# Patient Record
Sex: FEMALE | URBAN - METROPOLITAN AREA
[De-identification: names, ages, dates, MRNs, and addresses within clinical notes are randomized per-mention and may not be internally consistent; named-entity substitution may affect disease eponyms.]

---

## 2021-03-24 ENCOUNTER — APPOINTMENT (RX ONLY)
Dept: URBAN - METROPOLITAN AREA CLINIC 134 | Facility: CLINIC | Age: 66
Setting detail: DERMATOLOGY
End: 2021-03-24

## 2021-03-24 VITALS — HEIGHT: 66 IN | WEIGHT: 125 LBS | TEMPERATURE: 97.16 F

## 2021-03-24 DIAGNOSIS — L65.0 TELOGEN EFFLUVIUM: ICD-10-CM

## 2021-03-24 PROCEDURE — ? ADDITIONAL NOTES

## 2021-03-24 PROCEDURE — ? TREATMENT REGIMEN

## 2021-03-24 PROCEDURE — ? COUNSELING

## 2021-03-24 PROCEDURE — 99214 OFFICE O/P EST MOD 30 MIN: CPT

## 2021-03-24 ASSESSMENT — LOCATION ZONE DERM: LOCATION ZONE: SCALP

## 2021-03-24 ASSESSMENT — LOCATION DETAILED DESCRIPTION DERM: LOCATION DETAILED: LEFT MEDIAL FRONTAL SCALP

## 2021-03-24 ASSESSMENT — LOCATION SIMPLE DESCRIPTION DERM: LOCATION SIMPLE: LEFT SCALP

## 2021-03-24 NOTE — PROCEDURE: ADDITIONAL NOTES
Detail Level: Simple
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  Etiology discussed.  Nutrafol discussed in place of her daily multi vitamin.  I would encourage she take the Nutrafol for 3-6 months to help get her through this phase of hair loss. I would like to see all her labs that have been done from Dr. Rosado, Dr. Braun, and Dr. Arango for evaluation.
Render Risk Assessment In Note?: no

## 2021-03-24 NOTE — HPI: HAIR LOSS
Previous Labs: Yes
How Did The Hair Loss Occur?: gradual in onset
How Severe Is Your Hair Loss?: mild
When Were The Labs Drawn? (Drawn...): Dr. Rosado’s office
Lab Details: WNL

## 2021-07-16 ENCOUNTER — APPOINTMENT (RX ONLY)
Dept: URBAN - METROPOLITAN AREA CLINIC 134 | Facility: CLINIC | Age: 66
Setting detail: DERMATOLOGY
End: 2021-07-16

## 2021-07-16 VITALS — WEIGHT: 130 LBS | HEIGHT: 67 IN

## 2021-07-16 DIAGNOSIS — Z71.89 OTHER SPECIFIED COUNSELING: ICD-10-CM

## 2021-07-16 DIAGNOSIS — L72.11 PILAR CYST: ICD-10-CM

## 2021-07-16 DIAGNOSIS — H10.45 OTHER CHRONIC ALLERGIC CONJUNCTIVITIS: ICD-10-CM

## 2021-07-16 DIAGNOSIS — D17 BENIGN LIPOMATOUS NEOPLASM: ICD-10-CM

## 2021-07-16 DIAGNOSIS — L70.8 OTHER ACNE: ICD-10-CM

## 2021-07-16 PROBLEM — D17.21 BENIGN LIPOMATOUS NEOPLASM OF SKIN AND SUBCUTANEOUS TISSUE OF RIGHT ARM: Status: ACTIVE | Noted: 2021-07-16

## 2021-07-16 PROCEDURE — 99214 OFFICE O/P EST MOD 30 MIN: CPT | Mod: 25

## 2021-07-16 PROCEDURE — ? TREATMENT REGIMEN

## 2021-07-16 PROCEDURE — ? OBSERVATION

## 2021-07-16 PROCEDURE — ? OTHER

## 2021-07-16 PROCEDURE — 10040 EXTRACTION: CPT

## 2021-07-16 PROCEDURE — ? ACNE SURGERY

## 2021-07-16 PROCEDURE — ? COUNSELING

## 2021-07-16 ASSESSMENT — LOCATION SIMPLE DESCRIPTION DERM
LOCATION SIMPLE: POSTERIOR SCALP
LOCATION SIMPLE: RIGHT FOREARM
LOCATION SIMPLE: RIGHT INFERIOR EYELID
LOCATION SIMPLE: UPPER BACK

## 2021-07-16 ASSESSMENT — LOCATION DETAILED DESCRIPTION DERM
LOCATION DETAILED: RIGHT INFERIOR OCCIPITAL SCALP
LOCATION DETAILED: SUPERIOR THORACIC SPINE
LOCATION DETAILED: RIGHT LATERAL INFERIOR EYELID
LOCATION DETAILED: RIGHT DISTAL DORSAL FOREARM

## 2021-07-16 ASSESSMENT — LOCATION ZONE DERM
LOCATION ZONE: TRUNK
LOCATION ZONE: SCALP
LOCATION ZONE: EYELID
LOCATION ZONE: ARM

## 2021-07-16 NOTE — PROCEDURE: TREATMENT REGIMEN
Samples Given: Retin-A micro 0.06% - apply small amount to cyst daily
Detail Level: Zone
Otc Regimen: Retin-a micro 0.06% - apply thin film daily
Otc Regimen: Nasocort - 2 squirts
Plan: AM:\\nCleanse with water\\nThermal water\\nVanicream with HA\\nSPF (neutrogena) \\n\\nPM:\\nVanicream bar or liquid soap\\nThermal water\\nVanicream with HA
Continue Regimen: Zyrtec

## 2021-07-16 NOTE — HPI: SKIN LESION
What Type Of Note Output Would You Prefer (Optional)?: Standard Output
How Severe Is Your Skin Lesion?: mild
Has Your Skin Lesion Been Treated?: not been treated
Is This A New Presentation, Or A Follow-Up?: Skin Lesions
Is This A New Presentation, Or A Follow-Up?: Skin Lesion
Additional History: \\n\\nThe patient thought this was a pimple but it is not healing.

## 2021-07-16 NOTE — PROCEDURE: ACNE SURGERY
Prep Text (Optional): Prior to removal the treatment areas were prepped in the usual fashion.
Render Number Of Lesions Treated: no
Post-Care Instructions: I reviewed with the patient in detail post-care instructions. Patient is to keep the treatment areas dry overnight, and then apply bacitracin twice daily until healed. Patient may apply hydrogen peroxide soaks to remove any crusting.
Detail Level: Detailed
Acne Type: Comedonal Lesions
Consent was obtained and risks were reviewed including but not limited to scarring, infection, bleeding, scabbing, incomplete removal, and allergy to anesthesia.
Extraction Method: extractor

## 2021-07-16 NOTE — HPI: OTHER
Condition:: Swelling
Please Describe Your Condition:: \\nThe patient has noticed swelling under her right eye for the last few months.

## 2021-07-16 NOTE — PROCEDURE: OTHER
Detail Level: Zone
Render Risk Assessment In Note?: yes
Other (Free Text): Will refer to general surgery if the patient would like this lesion removed
Note Text (......Xxx Chief Complaint.): This diagnosis correlates with the

## 2021-08-13 ENCOUNTER — APPOINTMENT (RX ONLY)
Dept: URBAN - METROPOLITAN AREA CLINIC 134 | Facility: CLINIC | Age: 66
Setting detail: DERMATOLOGY
End: 2021-08-13

## 2021-08-13 VITALS — HEIGHT: 67 IN | WEIGHT: 130 LBS

## 2021-08-13 DIAGNOSIS — Z71.89 OTHER SPECIFIED COUNSELING: ICD-10-CM

## 2021-08-13 DIAGNOSIS — L81.0 POSTINFLAMMATORY HYPERPIGMENTATION: ICD-10-CM

## 2021-08-13 PROBLEM — D23.4 OTHER BENIGN NEOPLASM OF SKIN OF SCALP AND NECK: Status: ACTIVE | Noted: 2021-08-13

## 2021-08-13 PROCEDURE — ? COUNSELING

## 2021-08-13 PROCEDURE — 99212 OFFICE O/P EST SF 10 MIN: CPT | Mod: 25

## 2021-08-13 PROCEDURE — ? OBSERVATION

## 2021-08-13 PROCEDURE — ? TREATMENT REGIMEN

## 2021-08-13 PROCEDURE — ? INCISION AND DRAINAGE

## 2021-08-13 PROCEDURE — 10060 I&D ABSCESS SIMPLE/SINGLE: CPT

## 2021-08-13 ASSESSMENT — LOCATION ZONE DERM: LOCATION ZONE: TRUNK

## 2021-08-13 ASSESSMENT — LOCATION SIMPLE DESCRIPTION DERM: LOCATION SIMPLE: UPPER BACK

## 2021-08-13 ASSESSMENT — LOCATION DETAILED DESCRIPTION DERM: LOCATION DETAILED: INFERIOR THORACIC SPINE

## 2021-08-13 NOTE — PROCEDURE: INCISION AND DRAINAGE
Consent was obtained and risks were reviewed including but not limited to delayed wound healing, infection, need for multiple I and D's, and pain.
Epidermal Closure: simple interrupted
Drainage Amount?: minimal
Detail Level: Detailed
Method: sterile needle
Body Location Override (Optional - Billing Will Still Be Based On Selected Body Map Location If Applicable): right inferior occipital scalp
Dressing: no dressing
Lesion Type: Cyst
Epidermal Sutures: 4-0 Ethilon
Curette: No
Wound Care: Petrolatum
Size Of Lesion In Cm (Optional But May Be Required For Some Insurances): 0
Anesthesia Type: 1% lidocaine with epinephrine
Curette Text (Optional): After the contents were expressed a curette was used to partially remove the cyst wall.
Anesthesia Volume In Cc: 0.6
Suture Text: The incision was partially closed with
Preparation Text: The area was prepped in the usual clean fashion.
Post-Care Instructions: I reviewed with the patient in detail post-care instructions. Patient should keep wound covered and call the office should any redness, pain, swelling or worsening occur.
Drainage Type?: cyst-like

## 2021-08-13 NOTE — PROCEDURE: TREATMENT REGIMEN
Detail Level: Zone
Continue Regimen: Finish remaining Retin-A micro 0.06%, then start sample tubes of Retin-a micro 0.08% cream- apply small amount to cysts daily
Plan: AM:\\nCleanse with water\\nThermal water\\nVanicream with HA\\nSPF (neutrogena) \\n\\nPM:\\nVanicream bar or liquid soap\\nThermal water\\nVanicream with HA
Plan: Re evaluate in 1 month.
Initiate Treatment: Discussed Retin-a, nightly should help.
Samples Given: Retin-A micro 0.08% cream.

## 2021-09-09 ENCOUNTER — APPOINTMENT (RX ONLY)
Dept: URBAN - METROPOLITAN AREA CLINIC 134 | Facility: CLINIC | Age: 66
Setting detail: DERMATOLOGY
End: 2021-09-09

## 2021-09-09 VITALS — WEIGHT: 128 LBS | HEIGHT: 67 IN

## 2021-09-09 DIAGNOSIS — L72.11 PILAR CYST: ICD-10-CM

## 2021-09-09 PROCEDURE — ? OBSERVATION

## 2021-09-09 PROCEDURE — ? TREATMENT REGIMEN

## 2021-09-09 PROCEDURE — 99213 OFFICE O/P EST LOW 20 MIN: CPT

## 2021-09-09 PROCEDURE — ? COUNSELING

## 2021-09-09 ASSESSMENT — LOCATION ZONE DERM: LOCATION ZONE: SCALP

## 2021-09-09 ASSESSMENT — LOCATION DETAILED DESCRIPTION DERM: LOCATION DETAILED: RIGHT INFERIOR OCCIPITAL SCALP

## 2021-09-09 ASSESSMENT — LOCATION SIMPLE DESCRIPTION DERM: LOCATION SIMPLE: POSTERIOR SCALP

## 2021-09-09 NOTE — PROCEDURE: TREATMENT REGIMEN
Samples Given: RetinA 0.06%
Continue Regimen: Retin-A micro 0.06% - apply small amount to cyst daily
Plan: rtc if cyst becomes larger or becomes tender.
Detail Level: Zone